# Patient Record
(demographics unavailable — no encounter records)

---

## 2024-11-25 NOTE — HISTORY OF PRESENT ILLNESS
[Y] : Patient is sexually active [N] : Patient denies prior pregnancies [Currently Active] : currently active [Men] : men [No] : No [TextBox_4] : The 20-year-old patient presents today for a follow-up visit and birth control refill. Patient is doing well. She offers no complaints. All questions were answered in easy-to-understand language. Patient notes she is doing study abroad and will be in [place] for 5 months. Patient notes she stopped getting her period and now has irregular menses on the pill. [LMPDate] : 11/15/24 [MensesFreq] : 28-30 [MensesLength] : 5

## 2024-11-25 NOTE — PLAN
[FreeTextEntry1] : 1)  Self breast exam instructions, mammography discussed with the patient. 2)  Lipid profile assessment, TSH screening, fasting glucose testing, and vitamin D supplementation were discussed with the patient. 3)  Maintain healthy weight. 4)  Regular health maintenance with PCP. 5)  Remain tobacco free. 6)  Limit alcohol intake to less than 5 drinks per week. 7)  Osteoporosis screening and colonoscopy screening. 8)  Annual cholesterol screening. 9)  Annual influenza vaccine. 10) The importance of routine physical activity was reviewed and a goal of 150 minutes of moderately vigorous exercise per week was endorsed.   Yearly breast cancer screening with no current clinical or radiographic concerns. The patient was reminded regarding future well breast and general healthcare, breast cancer risk reduction, the importance of self-examination and the need for follow up. She was again reminded of the need to take Vit D3 2500 IU daily or to keep a Vit D level above 30, and Turmeric 1000 mg daily with Black Pepper. Plan continued yearly imaging and breast follow up, sooner as needed. I counseled the patient on current recommendations to reduce breast cancer risk including but not inclusive to regular exercise 20-30 minutes 3-4 times a week, low fat diet, limiting alcohol consumption, maintenance of ideal body weight, yearly imaging and self-breast awareness. Questions answered. I encouraged in light of COVID-19, social distancing, frequent hand washing and precautions to stay healthy.  The risk, benefits, and alternatives to an OCP were discussed and all questions were answered. Currently there are no contraindications of taking hormonal birth control. Instructions for usage and ASA for travel were discussed. Please schedule a follow-up appointment in 6 months. STD prevention was discussed with the patient. Risks, benefits, alternatives of hormonal contraceptive use were discussed with the patient including but not limited to risk of contraceptive failure, deep venous thrombosis or embolism, cardiovascular disease, sexually transmitted disease transmission without use of barrier method. Self-breast exam instructions were discussed with the patient.  Explained to patient that not getting periods on OCPs is not unusual.    I have spent 40 minutes of time on this encounter. Greater than 50% of the face-to-face encounter time was spent on counseling and/or coordination of care for examination findings, differential, testing, management and planning.

## 2024-11-25 NOTE — PHYSICAL EXAM
[Chaperone Present] : A chaperone was present in the examining room during all aspects of the physical examination [Appropriately responsive] : appropriately responsive [Alert] : alert [No Acute Distress] : no acute distress [No Lymphadenopathy] : no lymphadenopathy [Soft] : soft [Non-tender] : non-tender [Non-distended] : non-distended [No HSM] : No HSM [No Lesions] : no lesions [No Mass] : no mass [Oriented x3] : oriented x3 [Examination Of The Breasts] : a normal appearance [Normal] : normal [No Masses] : no breast masses were palpable [FreeTextEntry2] : Nel Gallegos [FreeTextEntry3] : This note was written by Nel Gallegos on 11/25/2024, acting as a scribe for Dr. Lukasz Lemons MD. All medic record entries were at my, Dr. Lukasz Lemons MD, direction and personally dictated by me in 11/25/2024. I have personally reviewed the chart and agree that the record accurately reflects my personal performance of the history, physical exam, assessment, and plan.

## 2025-05-15 NOTE — IMPRESSION
[Spirometry] : Spirometry [Fractional of Exhaled Nitric Oxide ___] : Fractional of Exhaled Nitric Oxide [unfilled] [Mild] : (mild) [Intermediate] : intermediate

## 2025-05-15 NOTE — CONSULT LETTER
[Dear  ___] : Dear  [unfilled], [Consult Letter:] : I had the pleasure of evaluating your patient, [unfilled]. [Please see my note below.] : Please see my note below. [Consult Closing:] : Thank you very much for allowing me to participate in the care of this patient.  If you have any questions, please do not hesitate to contact me. [Sincerely,] : Sincerely, [FreeTextEntry2] : SAVITA BELL \par   \par   [FreeTextEntry3] : Laine Sanchez MD\par  Attending Physician \par  Division of Allergy/Immunology \par  [facility] \par  \par   of Medicine and Pediatrics\par  [facility] \par  \par  [address]\[place] [address]\par  Tel: [phone]\par  Fax: [phone]\par  Email: [email or web address]  \par  \par  \par

## 2025-05-15 NOTE — HISTORY OF PRESENT ILLNESS
[de-identified] : Edwina is a 21 year old girl with asthma and allergic rhinitis who is here for follow-up asthma and allergies.  Pt was abroad  - May.  Continued on dupixent while she was away.  She is due for another dose but has a new insurance carrier. Took her Symbicort summer and 2024 and stopped prior to leaving for [place] in 2025. No cough, chest tightness or SOB. Did not use any albuterol while she was away. Came home to [place] a week ago and used her albuterol for the first time yesterday.  Skin is currently stable. Using steroid cream only minimally.  No ocular issues now or while she was away.    2024: Eczema - restarted Dupixent a week after her last visit.  Skin cleared up within a few days. She continues to take this every 2 weeks. No rash after her last few doses.   Asthma - no cough, no wheeze. No albuterol use.  Does cardio and keeps up with a workout - no inhaler use. She has been adherent with doses of Symbicort. No nocturnal awakenings or nocturnal cough. No chest tightness or SOB.  Working in a [employer] office in the city.   May 2024: She continues on dupixent every 2 weeks - pt self-injects.  Asthma has been very well-controlled. No OCS in the past few years. Stopped controller. Rarely uses albuterol - last use over 1 year ago.   Eczema is overall better but does still flare from time to time. For about 2 days after a dose, pt gets very red, patchy, dry areas on her face, and they eventually heal with topical steroid us. After skin heals she will get some intermittent flares on her face only.   No accidental ingestions of shellfish.  Last accidental exposure was at the age of 5.  Has an epipen but it's . Refuses to eat fish due to her shellfish allergy - interested in testing.  Allergic rhinitis sx are flaring. Takes Xyzal daily except for the last 5 days.   Anytime pt eats dairy stomach becomes bloated. No loose BMs or gas after dairy. Started 1 year ago. Gluten also bothers her - bloated. Mom has hashimoto's.   2023: Continues on dupixent q2 weeks - health services injects her otherwise her mom does it. Pt wants to learn. She is the best she has felt from an asthma perspective in years. Eczema has improved a lot too. No albuterol use in 2 years except for a few days when the air quality was poor a few weeks ago. Still takes symbicort 160 2 puffs BID. Has some cough but mostly due to allergy sx. Has had some symptoms of allergic conjunctivitis - appeared 2 months ago. Has been using antihistamine eye drop. Watery and red. No activity limitations. Goes to the gym and does not need her albuterol. Had mono while at college in March. Initially treated with amox before diagnosed with matthew and hasd a terrible rash. Took off from school for 3 weeks.  Aug 2021: Had COVID Dec 3rd - Had a sore throat, the next AM she had back pain. Got tested and nasal PCR was positive. No fever, no cough, no shortness of breath. Very fatigued and had a bad headache. Some chills. Decreased appetite. No emesis or diarrhea.  Had discussed antibody infusions with patient (see prior notes) but ultimately with shared decision making pt and family declined.   No ED visits, no hospitalizations, no prednisone use. Can't recall the last time she took her albuterol. Even after last visit in 2020 - inconsistent use of symbicort. Thinks she stopped ICS altogether at that time as well. She has not taken it in well over a year.  No daytime or nocturnal cough. No activity. Climbs stairs without windedness. No colds that she can remember.  She had been on DUpixent from 2019 until end  until end of 2021. Pt notes that rachel felt fine while she was off of Duplixent but eczema was flaring quite a bit on her eyelids. No infection. Eczema improved once pt restarted dupixent.   2020: Started dupixent in early September. Her mother has been injecting her with the dupixent,. No redness at injection site. No scars. No allergic reactions to the drug. Breathing feels better. Still takes her ICS twice a day. Nasal congestion is improved.  No prednisone since starting. No ED visits.  Used albuterol only for lacrosse which started 2 weeks ago. Uses albuterol before running and it is easier.  Atopic derm is dramatically improved. Starting taking prozac.  August: Since her last visit she has been taking her symbicort 160 2 puffs BID without missing any doses. [place]  Used her albuterol inhaler a few times over the trip when she went hiking at higher altitudes.  Returned on  and felt well.  She was well until  when she felt like she was getting a cold.  The next day she felt tight but then felt better and then felt tight again on Saturday. Went to the PMD who said she sounded tight but was moving air.  She started prednisone 60mg on day 1, then 40mg after that.  She is currently on day 4 of a 5 day course.   Still has some eczema on her face and posterior thigh; using mometasone cream.  Continuing to take daily Xyzal and Flonase. Rx for dupilumab has been submitted and awaiting approval.  2019: Since her last visit she has not needed to use her ventolin during the day but uses it at night occasionally. SHe wakes up from sleep with chest tightness. takes 2 puffs with spacer and feels better. Patient does not notice night time cough but her mother does.  Completed lacrosse so has not been as active. Unable to quantify activity limitation. Takes symbicort 160 2 puffs BID. Also takes montelukast nightly.  Going on a teen tour to [place], [place], [place] and [place] for a month in July.   Nasal spray has helped nasal congestion a lot.  Also takes xyzal daily.   She took her AM dose of Symbicort this AM.   2019: Recently admitted to the PICU x 5 days - end of April.  On BiPAP Initially seen in [facility] then was discharged after the BTB treatments. Went to PMD the next day. Took albuterol and budesonide in the nebulizier but she was still in distress. [place] [facility] [place] sent to [place]. This exacberation started with a cold.   Triggers - colds, dust, pollen, dogs (pet dog that does not sleep with her). Reportedly fine with her own dog but other dogs flare her sx. Takes albuterol before gym. No nocturnal cough apart from colds.  Keeps up with kids in gym class if she takes albuterol pre-gym. Takes OCS at least 2-3 times a year.  Previously noncompliant with advair for years - took about once a week.  Taking albuterol almost everyday.   Discharged on Symbicort 160. Now she takes symbicort daily.  Uses symbicort. Feels better in general.  Using ventolin only in the middle of the night but not often - maybe 2 times a week.   Father has asthma.  Allergic rhinitis: Symptoms include nasal congestion, rhinorrhea, sneezing, post-nasal drip, ocular pruritus, nasal pruritus, watery eyes, snoring, and mouth breathing. Symptoms are present all year long. Medications include xyzal. Last dose was 3 days ago.  Never tried flonase. Adenoidectomy - about 5 years ago. No tonsillectomy.   Shellfish allergy - when she was 6 or 7 years old she had shrimp (previosuly tolerated). Developed total body hives and then had an asthma attack.  She was tested and was positive to all shellfish. Avoids shellfish. Carries epipen.  At one time ate a tomato that was contaminated by crab. Afraid to eat swimming fish.   Food allergy: Tolerates milk, eggs, wheat, soy, peanut, tree nut, fish and shellfish.  PCN - She had a reaction when she was a few years old. Rash appeared after about the 5th day.  itchy rash. No blisters in her mouth. No ED visit.  No peeling skin. Mom and brother allergic to penicillin.   This was her first course of PCN. Being treated for AOM and strep throat.   Meningitis as a baby as well as RSV.

## 2025-06-06 NOTE — HISTORY OF PRESENT ILLNESS
[Oral Contraceptive] : uses oral contraception pills [Y] : Patient is sexually active [No] : Patient does not have concerns regarding sex [FreeTextEntry1] : Pt presents today for an annual exam and a 6-month birth control refill. Pt is doing well. She offers no complaints. We reviewed together in detail her past medical and surgical histories, allergies and medication usage, and social and family history. All questions were answered in easy-to-understand language.  [LMPDate] : 5/8/25 [MensesFreq] : 28-30 [MensesLength] : 4

## 2025-06-06 NOTE — PLAN
[FreeTextEntry1] : I have spent 40 minutes of time on this encounter. Greater than 50% of the face-to-face encounter time was spent on counseling and/or coordination of care for examination findings, differential, testing, management, and planning. 10 minutes were allotted to discussing the depression screening.    Yearly breast cancer screening with no current clinical or radiographic concerns. The patient was reminded regarding future well breast and general healthcare, breast cancer risk reduction, the importance of self-examination, and the need for follow-up. She was again reminded of the need to take Vit D3 2500 IU daily or to keep a Vit D level above 30, and Tumeric 1000 mg daily with Black Pepper. Plan continued yearly imaging and breast follow-up, sooner as needed. I counseled the patient on current recommendations to reduce breast cancer risk including but not inclusive to regular exercise 20-30 minutes 3-4 times a week, low-fat diet, limiting alcohol consumption, maintenance of ideal body weight, yearly imaging, and self-breast awareness. Questions answered. I encouraged in light of Covid 19, social distancing, frequent hand washing, and precautions to stay healthy.    The risks, benefits, and alternatives of an OCP were discussed and all questions were answered. Currently, there are no contraindications to taking hormonal birth control.  Instructions for usage and ASA for travel were discussed. Please schedule a follow-up appointment in 6 months.  STD prevention was discussed with the patient. Risks, benefits, and alternatives of hormonal contraceptive use were discussed with the patient including but not limited to risk of contraceptive failure, deep venous thrombosis or embolism, cardiovascular disease, and sexually transmitted disease transmission without the use of barrier method.  Self-breast exam instructions were discussed with the patient.   Pt wishes to obtain STI testing off of her pap smear. All questions were answered.

## 2025-06-06 NOTE — PHYSICAL EXAM
[Chaperoned Physical Exam] : A chaperone was present in the examining room during all aspects of the physical examination. [Appropriately responsive] : appropriately responsive [Alert] : alert [No Acute Distress] : no acute distress [No Lymphadenopathy] : no lymphadenopathy [Soft] : soft [Non-tender] : non-tender [Non-distended] : non-distended [No HSM] : No HSM [No Lesions] : no lesions [No Mass] : no mass [Oriented x3] : oriented x3 [Examination Of The Breasts] : a normal appearance [No Masses] : no breast masses were palpable [Labia Majora] : normal [Labia Minora] : normal [Normal] : normal [Uterine Adnexae] : normal [FreeTextEntry2] : This note was written by Monica Hollins actively solely DR. SIMÓN M.D.   All medical record entries made by the Scribe were at my, DR. SIMÓN M.D., direction and personally dictated by me during this visit. I have personally reviewed the chart and agree that the record reflects my personal performance of the history, physical exam, assessment, and plan.